# Patient Record
(demographics unavailable — no encounter records)

---

## 2024-10-18 NOTE — INTERPRETER SERVICES
[[employer] ] : provided by [employer]   [Interpreters_IDNumber] : [id number] [Interpreters_FullName] : Juma [TWNoteComboBox1] : Syrian

## 2024-10-18 NOTE — PLAN
[FreeTextEntry1] : Ms. Hui is a 78 year old female with past medical history of hypertension, type 2 diabetes, left knee replacement on doxycycline x 1 month for "inflammation" presented to ED on 10/15 with acute onset right-sided abdominal pain that radiates to the back that woke her from sleep. She was admitted and underwent laparoscopic appendectomy. She was discharged home on 10/16 to follow up with Dr. Ford. Seen today for follow up.   -Follow up with PCP Dr. Reyes, daughter to schedule -Follow up with surgeon Dr. Ford 10/29 -Follow up with Endo Dr. Lemus 11/11 -Follow up with GI Dr. Ahmadi 11/18  Educated patient and family on fall and safety precautions, aspiration precautions, good skin care and importance to notify MD / return to ED for any change in mental status or worsening s/s as reviewed. Reinforced provider follow up and medication compliance. 24/7 TCM contact provided and encouraged to call with any questions or concerns.

## 2024-10-18 NOTE — HISTORY OF PRESENT ILLNESS
[Home] : at home, [unfilled] , at the time of the visit. [Other Location: e.g. Home (Enter Location, City,State)___] : at [unfilled] [Verbal consent obtained from patient] : the patient, [unfilled] [Family Member] : family member [FreeTextEntry1] : F/u post hospital discharge [facility] [de-identified] : Ms. Hui is a 78 year old female with past medical history of hypertension, type 2 diabetes, left knee replacement on doxycycline x 1 month for "inflammation" presented to ED on 10/15 with acute onset right-sided abdominal pain that radiates to the back that woke her from sleep. CT scan in the ED showed: Fluid-filled, dilated appendix with minimal surrounding stranding, raising a question of appendicitis. Recommend clinical correlation. No bowel obstruction, organized fluid collection or intraperitoneal free air. Pancreatic ductal dilatation. Recommend comparison to previous outside study. Follow-up (MR/MRCP) may be obtained as indicated. Patient was taken to the OR and underwent a laparoscopic appendectomy. Patient tolerated the procedure well and was transferred from PACU to surgical floor. Post-operatively diet was advanced as tolerated. She was discharged home on 10/16 to follow up with Dr. Ford. Seen today for follow up.   Patient with no access to smart phone/laptop and does not have the ability to do the video telehealth, telephonic visit facilitated and completed with daughter Natalie. Since discharge home, she is feeling okay, pain is managed without medications and monitoring site. Medications and discharge instructions reviewed, aware to resume Metformin and Januvia - not monitoring fasting FS. Denies any fever/chills, SOB, CP, n/v/d or oozing/drainage. No polyuria, polydipsia or blurry vision.

## 2024-10-18 NOTE — REVIEW OF SYSTEMS
[Fever] : no fever [Chills] : no chills [Negative] : Musculoskeletal [de-identified] : s/p laparoscopic appendectomy incision site

## 2024-10-18 NOTE — PHYSICAL EXAM
[de-identified] : Physical exam limited d/t telephonic encounter, pt conversing iwth no distress noted

## 2024-11-11 NOTE — PHYSICAL EXAM

## 2024-11-11 NOTE — PHYSICAL EXAM

## 2024-11-22 NOTE — HISTORY OF PRESENT ILLNESS
[FreeTextEntry1] : Misty Hui was brought by her daughter and healthcare proxy for follow-up as post appendectomy hospitalization visit She was asked to see gastroenterologist follow-up for abnormal CT scan showing 4 mm size pancreatic duct in the head No history of pancreatico biliary disease in the past.  She is scheduled for MRCP MRI next week She denied fever, chills, nausea or vomiting, change in her bowel habits or any rectal bleeding However she reports mild abdominal pain at the surgical scar appetite and bowel functions are normal She got partial relief with simethicone tablets for her abdominal bloating

## 2024-11-22 NOTE — ASSESSMENT
[FreeTextEntry1] : Dilated pancreatic duct on recent CT scan likely nonspecific or age-related atrophy Recent appendectomy uneventful course of hospitalization Advised patient and her daughter to call me after MRI is done for further follow-up Dietary modifications discussed

## 2024-12-11 NOTE — REASON FOR VISIT
[Initial Consultation] : an initial consultation for [Family Member] : family member [[employer] ] : provided by [employer]   [FreeTextEntry2] : main PD dilatation [TWNoteComboBox1] : Djiboutian

## 2024-12-11 NOTE — HISTORY OF PRESENT ILLNESS
[de-identified] : Ms. GER SINGH is a 78-year-old Italian-speaking female presenting for initial consultation regarding mild PD dilation noted on inpatient CT scan. Referred by Dr. Cj Lopez. Her PMHx is significant for CAD, CHF NYHA class I with hypertrophic cardiomyopathy, aortic valve stenosis, anemia, GERD, DM Type II with stage 2 CKD and HTN, HLD; PSHx of s/p laparoscopic appendectomy 10/15/2024, s/p TAVR, hysterectomy. FMHx of "vaginal cancer" (mother) and aunt with unknown cancer. No personal history of pancreatitis.  Patient presented to the ED in October 2024 with abdominal pain, found to have appendicitis s/p laparoscopic appendectomy 10/15/2024 with Dr. Saunders. 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 No post-op complications. Postoperatively developed abdominal pain and jaundice.  Inpatient CT 10/15/2024 showed pancreatic ductal dilatation, 0.4 cm at the head. Other findings included small fat-containing umbilical hernia No prior imaging for comparison.   Further evaluation with MRCP 11/19/2024 demonstrated similar to the prior CT, the pancreatic duct within the uncinate of pancreas is dilated to 4 mm; no evidence for obstructing stone or mass; a follow-up with CT or MRI in approximately 6 months is recommended to ensure stability.  Patient also saw GI Dr. Ahmadi 11/11/24 to follow-up on MRCP results. Reports recent changes in blood sugars (takes Janumet and Lantus @ night) - saw endo 11/22/24.  She presented back to the ED 12/4/24 with jaundice.   A/P CT at that time demonstrated prominence of the pancreatic duct up to 5 mm but no obstruction. LFTs were WNL at that time.  Patient presents for initial consultation. She denies history of pancreatitis, changes in BMs, appetite changes, unintentional weight loss. She reports recent worsening DM. Scheduled to get repeat labs with Dr. Lopez.

## 2024-12-11 NOTE — REASON FOR VISIT
[Initial Consultation] : an initial consultation for [Family Member] : family member [[employer] ] : provided by [employer]   [FreeTextEntry2] : main PD dilatation [TWNoteComboBox1] : Trinidadian

## 2024-12-11 NOTE — ASSESSMENT
[FreeTextEntry1] : Ms. GER SINGH is a 78-year-old Moroccan-speaking female presenting for initial consultation regarding mild PD dilation noted on inpatient CT scan. Referred by Dr. Cj Lopez. Her PMHx is significant for CHF, aortic valve stenosis, DM Type II with stage 2 CKD and HTN. FMHx of "vaginal cancer" (mother) and aunt with unknown cancer.  Patient presented to the ED in October 2024, found to have appendicitis s/p laparoscopic appendectomy 10/15/2024 with Dr. Saunders. 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  Inpatient CT 10/15/2024 showed pancreatic ductal dilatation, 0.4 cm at the head. Other findings included small fat-containing umbilical hernia. Further evaluation with MRCP 11/19/2024 demonstrated similar to the prior CT, the pancreatic duct within the uncinate of pancreas is dilated to 4 mm; no evidence for obstructing stone or mass; a follow-up with CT or MRI in approximately 6 months is recommended to ensure stability. No prior imaging for comparison.   She presented back to the ED 12/4/24 with jaundice. A/P CT at that time demonstrated prominence of the pancreatic duct up to 5 mm but no obstruction. LFTs were WNL at that time.  I reviewed the results of her recent imaging studies which showed mild PD dilation up to 4 mm. I discussed that we classify dilation of 10 mm as worrisome. I discussed that pancreatic duct dilatation can be attributed to ductal ectasia, which is a completely benign process, or a small polyp in the duct. Given her reports of worsening DM, I recommend evaluation with an EUS - GI office will be in contact to schedule. If the EUS appears normal, will plan for MRI in 4-6 months and see her back after imaging. Patient was agreeable to plan.  She is OK to get repeat labs per Dr. Lopez.   Today, I personally spent 60 minutes in total time including reviewing imaging and studies, discussing complex treatment regimens, direct face to face time with the patient, patient education and counseling.

## 2024-12-11 NOTE — HISTORY OF PRESENT ILLNESS
[de-identified] : Ms. GER SINGH is a 78-year-old Maori-speaking female presenting for initial consultation regarding mild PD dilation noted on inpatient CT scan. Referred by Dr. Cj Lopez. Her PMHx is significant for CAD, CHF NYHA class I with hypertrophic cardiomyopathy, aortic valve stenosis, anemia, GERD, DM Type II with stage 2 CKD and HTN, HLD; PSHx of s/p laparoscopic appendectomy 10/15/2024, s/p TAVR, hysterectomy. FMHx of "vaginal cancer" (mother) and aunt with unknown cancer. No personal history of pancreatitis.  Patient presented to the ED in October 2024 with abdominal pain, found to have appendicitis s/p laparoscopic appendectomy 10/15/2024 with Dr. Saunders. 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 No post-op complications. Postoperatively developed abdominal pain and jaundice.  Inpatient CT 10/15/2024 showed pancreatic ductal dilatation, 0.4 cm at the head. Other findings included small fat-containing umbilical hernia No prior imaging for comparison.   Further evaluation with MRCP 11/19/2024 demonstrated similar to the prior CT, the pancreatic duct within the uncinate of pancreas is dilated to 4 mm; no evidence for obstructing stone or mass; a follow-up with CT or MRI in approximately 6 months is recommended to ensure stability.  Patient also saw GI Dr. Ahmadi 11/11/24 to follow-up on MRCP results. Reports recent changes in blood sugars (takes Janumet and Lantus @ night) - saw endo 11/22/24.  She presented back to the ED 12/4/24 with jaundice.   A/P CT at that time demonstrated prominence of the pancreatic duct up to 5 mm but no obstruction. LFTs were WNL at that time.  Patient presents for initial consultation. She denies history of pancreatitis, changes in BMs, appetite changes, unintentional weight loss. She reports recent worsening DM. Scheduled to get repeat labs with Dr. Lopez.

## 2024-12-11 NOTE — ASSESSMENT
[FreeTextEntry1] : Ms. GER SINGH is a 78-year-old Montserratian-speaking female presenting for initial consultation regarding mild PD dilation noted on inpatient CT scan. Referred by Dr. Cj Lopez. Her PMHx is significant for CHF, aortic valve stenosis, DM Type II with stage 2 CKD and HTN. FMHx of "vaginal cancer" (mother) and aunt with unknown cancer.  Patient presented to the ED in October 2024, found to have appendicitis s/p laparoscopic appendectomy 10/15/2024 with Dr. Saunders. 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  Inpatient CT 10/15/2024 showed pancreatic ductal dilatation, 0.4 cm at the head. Other findings included small fat-containing umbilical hernia. Further evaluation with MRCP 11/19/2024 demonstrated similar to the prior CT, the pancreatic duct within the uncinate of pancreas is dilated to 4 mm; no evidence for obstructing stone or mass; a follow-up with CT or MRI in approximately 6 months is recommended to ensure stability. No prior imaging for comparison.   She presented back to the ED 12/4/24 with jaundice. A/P CT at that time demonstrated prominence of the pancreatic duct up to 5 mm but no obstruction. LFTs were WNL at that time.  I reviewed the results of her recent imaging studies which showed mild PD dilation up to 4 mm. I discussed that we classify dilation of 10 mm as worrisome. I discussed that pancreatic duct dilatation can be attributed to ductal ectasia, which is a completely benign process, or a small polyp in the duct. Given her reports of worsening DM, I recommend evaluation with an EUS - GI office will be in contact to schedule. If the EUS appears normal, will plan for MRI in 4-6 months and see her back after imaging. Patient was agreeable to plan.  She is OK to get repeat labs per Dr. Lopez.   Today, I personally spent 60 minutes in total time including reviewing imaging and studies, discussing complex treatment regimens, direct face to face time with the patient, patient education and counseling.

## 2024-12-11 NOTE — HISTORY OF PRESENT ILLNESS
[de-identified] : Ms. GER SINGH is a 78-year-old Sami-speaking female presenting for initial consultation regarding mild PD dilation noted on inpatient CT scan. Referred by Dr. Cj Lopez. Her PMHx is significant for CAD, CHF NYHA class I with hypertrophic cardiomyopathy, aortic valve stenosis, anemia, GERD, DM Type II with stage 2 CKD and HTN, HLD; PSHx of s/p laparoscopic appendectomy 10/15/2024, s/p TAVR, hysterectomy. FMHx of "vaginal cancer" (mother) and aunt with unknown cancer. No personal history of pancreatitis.  Patient presented to the ED in October 2024 with abdominal pain, found to have appendicitis s/p laparoscopic appendectomy 10/15/2024 with Dr. Saunders. 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 No post-op complications. Postoperatively developed abdominal pain and jaundice.  Inpatient CT 10/15/2024 showed pancreatic ductal dilatation, 0.4 cm at the head. Other findings included small fat-containing umbilical hernia No prior imaging for comparison.   Further evaluation with MRCP 11/19/2024 demonstrated similar to the prior CT, the pancreatic duct within the uncinate of pancreas is dilated to 4 mm; no evidence for obstructing stone or mass; a follow-up with CT or MRI in approximately 6 months is recommended to ensure stability.  Patient also saw GI Dr. Ahmadi 11/11/24 to follow-up on MRCP results. Reports recent changes in blood sugars (takes Janumet and Lantus @ night) - saw endo 11/22/24.  She presented back to the ED 12/4/24 with jaundice.   A/P CT at that time demonstrated prominence of the pancreatic duct up to 5 mm but no obstruction. LFTs were WNL at that time.  Patient presents for initial consultation. She denies history of pancreatitis, changes in BMs, appetite changes, unintentional weight loss. She reports recent worsening DM. Scheduled to get repeat labs with Dr. Lopez.

## 2024-12-11 NOTE — END OF VISIT
[FreeTextEntry3] : By signing my name below, I, Meamyvianney Garza, attest that this documentation has been prepared under the direction and in the presence of Silvestre Kramer MD in the following sections HISTORY OF PRESENT ILLNESS; PAST MEDICAL/FAMILY/SOCIAL HISTORY; REVIEW OF SYSTEMS; PHYSICAL EXAM; ASSESSMENT/PLAN.

## 2024-12-11 NOTE — REASON FOR VISIT
[Initial Consultation] : an initial consultation for [Family Member] : family member [[employer] ] : provided by [employer]   [FreeTextEntry2] : main PD dilatation [TWNoteComboBox1] : Czech

## 2024-12-11 NOTE — ASSESSMENT
[FreeTextEntry1] : Ms. GRE SINGH is a 78-year-old Burundian-speaking female presenting for initial consultation regarding mild PD dilation noted on inpatient CT scan. Referred by Dr. Cj Lopez. Her PMHx is significant for CHF, aortic valve stenosis, DM Type II with stage 2 CKD and HTN. FMHx of "vaginal cancer" (mother) and aunt with unknown cancer.  Patient presented to the ED in October 2024, found to have appendicitis s/p laparoscopic appendectomy 10/15/2024 with Dr. Saunders. 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  Inpatient CT 10/15/2024 showed pancreatic ductal dilatation, 0.4 cm at the head. Other findings included small fat-containing umbilical hernia. Further evaluation with MRCP 11/19/2024 demonstrated similar to the prior CT, the pancreatic duct within the uncinate of pancreas is dilated to 4 mm; no evidence for obstructing stone or mass; a follow-up with CT or MRI in approximately 6 months is recommended to ensure stability. No prior imaging for comparison.   She presented back to the ED 12/4/24 with jaundice. A/P CT at that time demonstrated prominence of the pancreatic duct up to 5 mm but no obstruction. LFTs were WNL at that time.  I reviewed the results of her recent imaging studies which showed mild PD dilation up to 4 mm. I discussed that we classify dilation of 10 mm as worrisome. I discussed that pancreatic duct dilatation can be attributed to ductal ectasia, which is a completely benign process, or a small polyp in the duct. Given her reports of worsening DM, I recommend evaluation with an EUS - GI office will be in contact to schedule. If the EUS appears normal, will plan for MRI in 4-6 months and see her back after imaging. Patient was agreeable to plan.  She is OK to get repeat labs per Dr. Lopez.   Today, I personally spent 60 minutes in total time including reviewing imaging and studies, discussing complex treatment regimens, direct face to face time with the patient, patient education and counseling.

## 2025-01-07 NOTE — ASSESSMENT
[FreeTextEntry1] : 78-year-old  woman with a dilated pancreatic duct with no obstructing lesions or mass. I prescribed Gas-X and Prilosec for her abdominal symptoms Advised follow-up and ?? EUS examination after 6 months

## 2025-01-07 NOTE — HISTORY OF PRESENT ILLNESS
[FreeTextEntry1] : GER SINGH 78 year HF  came for follow up visit. She had abdominal imagings including CT scan and MRIs and EUS by Dr. Gilberto Kennedy for evaluation of dilated /4mm pancreatic duct in the head EUS examination was unremarkable without any mass, stones or obstructing lesions in the pancreas.  CBD was normal Biopsy showed non sp gastritis. Denies any NVCD or BPR.No chest pain ,cough or SOB.  Patient complaining of abdominal bloating and gas, has been taking famotidine without much relief Good appetite and no weight loss. Recent MRI, CT AP and EUS/bx reviewed and discussed with her

## 2025-05-12 NOTE — PHYSICAL EXAM

## 2025-05-12 NOTE — ASSESSMENT
[FreeTextEntry1] : 78-year-old  woman recently diagnosed to have anemia without any GI symptoms.  She is due for her interval colonoscopy.  Upper endoscopy was unremarkable.  I explained RBA and bowel prep for colonoscopy.  GaviLyte-N was ordered

## 2025-05-12 NOTE — HISTORY OF PRESENT ILLNESS
[FreeTextEntry1] : Misty Martinez came for follow-up, she was accompanied by her daughter.  She was assisted by a  on the phone.  She was told by her PCP  to have anemia on her recent CBC .  However she denied nausea, vomiting, rectal bleeding, melena or any change in bowel habits.  Her appetite is good.  She does not have any cardiac or pulmonary symptoms.  I called PCP office to send the results.She had upper endoscopy and EUS in January2025.  She had colonoscopy more than 5 to 6 years ago in [place] reportedly normal

## 2025-06-26 NOTE — REASON FOR VISIT
Nursing Note by Tonia Ny RMA at 07/03/17 05:37 PM     Author:  Tonia Ny RMA Service:  (none) Author Type:  Medical Assistant     Filed:  07/03/17 05:38 PM Encounter Date:  7/3/2017 Status:  Signed     :  Tonia Ny RMA (Medical Assistant)            Shave bx[LG1.1M] wound care provided to patient. He/she expressed understanding. All questions were answered. Name and phone number provided if needed.   Time out involving the provider, staff and patient in exam room performed prior to procedure.[LG1.1T]  Electronically Signed by:    KOFFI Gray , 7/3/2017[LG1.2T]        Revision History        User Key Date/Time User Provider Type Action    > LG1.2 07/03/17 05:38 PM Tonia Ny RMA Medical Assistant Sign     LG1.1 07/03/17 05:37 PM Tonia Ny RMA Medical Assistant     M - Manual, T - Template             [Follow-Up Visit] : a follow-up visit for

## 2025-07-01 NOTE — HISTORY OF PRESENT ILLNESS
[de-identified] : Ms. GER SINGH is a 78-year-old Frisian-speaking female presenting for 6 months follow up visit  with  ([id number]) regarding mild PD dilation noted on inpatient CT scan. Referred by Dr. Cj Lopez. Her PMHx is significant for CAD, CHF NYHA class I with hypertrophic cardiomyopathy, aortic valve stenosis, anemia, GERD, DM Type II with stage 2 CKD and HTN, HLD; PSHx of s/p laparoscopic appendectomy 10/15/2024, s/p TAVR, hysterectomy. FMHx of "vaginal cancer" (mother) and aunt with unknown cancer. No personal history of pancreatitis.  Patient presented to the ED in October 2024 with abdominal pain, found to have appendicitis s/p laparoscopic appendectomy 10/15/2024 with Dr. Saunders. 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 No post-op complications. Postoperatively developed abdominal pain and jaundice.  Inpatient CT 10/15/2024 showed pancreatic ductal dilatation, 0.4 cm at the head. Other findings included small fat-containing umbilical hernia No prior imaging for comparison.   Further evaluation with MRCP 11/19/2024 demonstrated similar to the prior CT, the pancreatic duct within the uncinate of pancreas is dilated to 4 mm; no evidence for obstructing stone or mass; a follow-up with CT or MRI in approximately 6 months is recommended to ensure stability.  Patient also saw GI Dr. Ahmadi 11/11/24 to follow-up on MRCP results. Reports recent changes in blood sugars (takes Janumet and Lantus @ night) - saw endo 11/22/24.  She presented back to the ED 12/4/24 with jaundice.   A/P CT at that time demonstrated prominence of the pancreatic duct up to 5 mm but no obstruction. LFTs were WNL at that time.  Patient presents for initial consultation. She denies history of pancreatitis, changes in BMs, appetite changes, unintentional weight loss. She reports recent worsening DM. We discussed that we classify dilation of 10 mm as worrisome. I discussed that pancreatic duct dilatation can be attributed to ductal ectasia, which is a completely benign process, or a small polyp in the duct. Given her reports of worsening DM, I recommend evaluation with an EUS, if negative EUS, will plan to repeat MRI in 6 months.   Interval history:  Underwent a EUS on 1/2/25 with Dr. Alfonso, noted a mildly prominent pancreatic duct at the head ( 5mm), no mass seen. Moderate distal erosive esophagitis and mild gastropathy, Path:  GI recommended PPI once a day and repeat EGD in 2-3 months to check esophagitis healing.   MRI 6/16/25 showed an unchanged dilated pancreatic duct within the uncinate process (4mm) w/o evidence of obstructing mass or stone. no evidence of choledocholithiasis.   Interval history 6/27/25; Patient presents for her 6 months follow up. Patient reports feeling well overall, and her DM have gotten better. Upon review of her imaging, I discussed that her new MRI and her pancreatic duct look stable. Discussed IPMN vs. ductal atresia, which she could have either. I recommend that we should continue following her at current evaluation.  We will plan to see patient back in 1 year with repeat MRI at that time.  7/1/25 visit: Patient present for follow-up.

## 2025-07-01 NOTE — ASSESSMENT
[FreeTextEntry1] : Ms. 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    Patient present for follow-up.  Today, I personally spent   minutes in total time including reviewing imaging and studies, discussing complex treatment regimens, direct face to face time with the patient, patient education and counseling.

## 2025-07-01 NOTE — ASSESSMENT
[FreeTextEntry1] : Ms. 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    Patient present for follow-up.  Today, I personally spent   minutes in total time including reviewing imaging and studies, discussing complex treatment regimens, direct face to face time with the patient, patient education and counseling.

## 2025-07-01 NOTE — HISTORY OF PRESENT ILLNESS
[de-identified] : Ms. GER SINGH is a 78-year-old Danish-speaking female presenting for 6 months follow up visit  with  ([id number]) regarding mild PD dilation noted on inpatient CT scan. Referred by Dr. Cj Lopez. Her PMHx is significant for CAD, CHF NYHA class I with hypertrophic cardiomyopathy, aortic valve stenosis, anemia, GERD, DM Type II with stage 2 CKD and HTN, HLD; PSHx of s/p laparoscopic appendectomy 10/15/2024, s/p TAVR, hysterectomy. FMHx of "vaginal cancer" (mother) and aunt with unknown cancer. No personal history of pancreatitis.  Patient presented to the ED in October 2024 with abdominal pain, found to have appendicitis s/p laparoscopic appendectomy 10/15/2024 with Dr. Saunders. 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 No post-op complications. Postoperatively developed abdominal pain and jaundice.  Inpatient CT 10/15/2024 showed pancreatic ductal dilatation, 0.4 cm at the head. Other findings included small fat-containing umbilical hernia No prior imaging for comparison.   Further evaluation with MRCP 11/19/2024 demonstrated similar to the prior CT, the pancreatic duct within the uncinate of pancreas is dilated to 4 mm; no evidence for obstructing stone or mass; a follow-up with CT or MRI in approximately 6 months is recommended to ensure stability.  Patient also saw GI Dr. Ahmadi 11/11/24 to follow-up on MRCP results. Reports recent changes in blood sugars (takes Janumet and Lantus @ night) - saw endo 11/22/24.  She presented back to the ED 12/4/24 with jaundice.   A/P CT at that time demonstrated prominence of the pancreatic duct up to 5 mm but no obstruction. LFTs were WNL at that time.  Patient presents for initial consultation. She denies history of pancreatitis, changes in BMs, appetite changes, unintentional weight loss. She reports recent worsening DM. We discussed that we classify dilation of 10 mm as worrisome. I discussed that pancreatic duct dilatation can be attributed to ductal ectasia, which is a completely benign process, or a small polyp in the duct. Given her reports of worsening DM, I recommend evaluation with an EUS, if negative EUS, will plan to repeat MRI in 6 months.   Interval history:  Underwent a EUS on 1/2/25 with Dr. Alfonso, noted a mildly prominent pancreatic duct at the head ( 5mm), no mass seen. Moderate distal erosive esophagitis and mild gastropathy, Path:  GI recommended PPI once a day and repeat EGD in 2-3 months to check esophagitis healing.   MRI 6/16/25 showed an unchanged dilated pancreatic duct within the uncinate process (4mm) w/o evidence of obstructing mass or stone. no evidence of choledocholithiasis.   Interval history 6/27/25; Patient presents for her 6 months follow up. Patient reports feeling well overall, and her DM have gotten better. Upon review of her imaging, I discussed that her new MRI and her pancreatic duct look stable. Discussed IPMN vs. ductal atresia, which she could have either. I recommend that we should continue following her at current evaluation.  We will plan to see patient back in 1 year with repeat MRI at that time.  7/1/25 visit: Patient present for follow-up.

## 2025-07-03 NOTE — ASSESSMENT
[FreeTextEntry1] : Ms. 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    Surveillance MRI 6/16/25 showed an unchanged dilated pancreatic duct within the uncinate process (4mm) w/o evidence of obstructing mass or stone. no evidence of choledocholithiasis.   Presents for her 6 months f/u visit. Upon review of her imaging, I discussed that her new MRI and her pancreatic duct look stable which is good news. We discussed the possible diagnosis of the cause of her PD dilatation, could be either be a IPMN vs. ductal atresia, which warrants annual surveillance. We will plan to see patient back in 1 year with repeat MRI at that time. Instructed patient to call our office which changes with her pancreas, such as worsen DM, new onset pancreatitis or jaundice. Patient and family verbalized understanding.   Today, I personally spent 15 minutes in total time including reviewing imaging and studies, discussing complex treatment regimens, direct face to face time with the patient, patient education and counseling.

## 2025-07-03 NOTE — ASSESSMENT
[FreeTextEntry1] : Ms. 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    Surveillance MRI 6/16/25 showed an unchanged dilated pancreatic duct within the uncinate process (4mm) w/o evidence of obstructing mass or stone. no evidence of choledocholithiasis.   Presents for her 6 months f/u visit. Upon review of her imaging, I discussed that her new MRI and her pancreatic duct look stable which is good news. We discussed the possible diagnosis of the cause of her PD dilatation, could be either be a IPMN vs. ductal atresia, which warrants annual surveillance. We will plan to see patient back in 1 year with repeat MRI at that time. Instructed patient to call our office which changes with her pancreas, such as worsen DM, new onset pancreatitis or jaundice. Patient and family verbalized understanding.   Today, I personally spent 15 minutes in total time including reviewing imaging and studies, discussing complex treatment regimens, direct face to face time with the patient, patient education and counseling.

## 2025-07-03 NOTE — HISTORY OF PRESENT ILLNESS
[de-identified] : Ms. GER SINGH is a 78-year-old Kinyarwanda-speaking female presenting for 6 months follow up visit  with  ([id number]) regarding mild PD dilation noted on inpatient CT scan. Referred by Dr. Cj Lopez. Her PMHx is significant for CAD, CHF NYHA class I with hypertrophic cardiomyopathy, aortic valve stenosis, anemia, GERD, DM Type II with stage 2 CKD and HTN, HLD; PSHx of s/p laparoscopic appendectomy 10/15/2024, s/p TAVR, hysterectomy. FMHx of "vaginal cancer" (mother) and aunt with unknown cancer. No personal history of pancreatitis.  Patient presented to the ED in October 2024 with abdominal pain, found to have appendicitis s/p laparoscopic appendectomy 10/15/2024 with Dr. Saunders. 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 No post-op complications. Postoperatively developed abdominal pain and jaundice.  Inpatient CT 10/15/2024 showed pancreatic ductal dilatation, 0.4 cm at the head. Other findings included small fat-containing umbilical hernia No prior imaging for comparison.   Further evaluation with MRCP 11/19/2024 demonstrated similar to the prior CT, the pancreatic duct within the uncinate of pancreas is dilated to 4 mm; no evidence for obstructing stone or mass; a follow-up with CT or MRI in approximately 6 months is recommended to ensure stability.  Patient also saw GI Dr. Ahmadi 11/11/24 to follow-up on MRCP results. Reports recent changes in blood sugars (takes Janumet and Lantus @ night) - saw endo 11/22/24.  She presented back to the ED 12/4/24 with jaundice.   A/P CT at that time demonstrated prominence of the pancreatic duct up to 5 mm but no obstruction. LFTs were WNL at that time.  Patient presents for initial consultation. She denies history of pancreatitis, changes in BMs, appetite changes, unintentional weight loss. She reports recent worsening DM. We discussed that we classify dilation of 10 mm as worrisome. I discussed that pancreatic duct dilatation can be attributed to ductal ectasia, which is a completely benign process, or a small polyp in the duct. Given her reports of worsening DM, I recommend evaluation with an EUS, if negative EUS, will plan to repeat MRI in 6 months.   Interval history:  Underwent a EUS on 1/2/25 with Dr. Alfonso, noted a mildly prominent pancreatic duct at the head ( 5mm), no mass seen. Moderate distal erosive esophagitis and mild gastropathy, Path:  GI recommended PPI once a day and repeat EGD in 2-3 months to check esophagitis healing.   MRI 6/16/25 showed an unchanged dilated pancreatic duct within the uncinate process (4mm) w/o evidence of obstructing mass or stone. no evidence of choledocholithiasis.   Patient presents for her 6 months follow up. Patient reports feeling well overall, and her DM have gotten better.

## 2025-07-03 NOTE — ASSESSMENT
[FreeTextEntry1] : Ms. 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    Surveillance MRI 6/16/25 showed an unchanged dilated pancreatic duct within the uncinate process (4mm) w/o evidence of obstructing mass or stone. no evidence of choledocholithiasis.   Presents for her 6 months f/u visit. Upon review of her imaging, I discussed that her new MRI and her pancreatic duct look stable which is good news. We discussed the possible diagnosis of the cause of her PD dilatation, could be either be a IPMN vs. ductal atresia, which warrants annual surveillance. We will plan to see patient back in 1 year with repeat MRI at that time. Instructed patient to call our office which changes with her pancreas, such as worsen DM, new onset pancreatitis or jaundice. Patient and family verbalized understanding.   Today, I personally spent 15 minutes in total time including reviewing imaging and studies, discussing complex treatment regimens, direct face to face time with the patient, patient education and counseling.

## 2025-07-03 NOTE — HISTORY OF PRESENT ILLNESS
[de-identified] : Ms. GER SINGH is a 78-year-old Kazakh-speaking female presenting for 6 months follow up visit  with  ([id number]) regarding mild PD dilation noted on inpatient CT scan. Referred by Dr. Cj Lopez. Her PMHx is significant for CAD, CHF NYHA class I with hypertrophic cardiomyopathy, aortic valve stenosis, anemia, GERD, DM Type II with stage 2 CKD and HTN, HLD; PSHx of s/p laparoscopic appendectomy 10/15/2024, s/p TAVR, hysterectomy. FMHx of "vaginal cancer" (mother) and aunt with unknown cancer. No personal history of pancreatitis.  Patient presented to the ED in October 2024 with abdominal pain, found to have appendicitis s/p laparoscopic appendectomy 10/15/2024 with Dr. Saunders. 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 No post-op complications. Postoperatively developed abdominal pain and jaundice.  Inpatient CT 10/15/2024 showed pancreatic ductal dilatation, 0.4 cm at the head. Other findings included small fat-containing umbilical hernia No prior imaging for comparison.   Further evaluation with MRCP 11/19/2024 demonstrated similar to the prior CT, the pancreatic duct within the uncinate of pancreas is dilated to 4 mm; no evidence for obstructing stone or mass; a follow-up with CT or MRI in approximately 6 months is recommended to ensure stability.  Patient also saw GI Dr. Ahmadi 11/11/24 to follow-up on MRCP results. Reports recent changes in blood sugars (takes Janumet and Lantus @ night) - saw endo 11/22/24.  She presented back to the ED 12/4/24 with jaundice.   A/P CT at that time demonstrated prominence of the pancreatic duct up to 5 mm but no obstruction. LFTs were WNL at that time.  Patient presents for initial consultation. She denies history of pancreatitis, changes in BMs, appetite changes, unintentional weight loss. She reports recent worsening DM. We discussed that we classify dilation of 10 mm as worrisome. I discussed that pancreatic duct dilatation can be attributed to ductal ectasia, which is a completely benign process, or a small polyp in the duct. Given her reports of worsening DM, I recommend evaluation with an EUS, if negative EUS, will plan to repeat MRI in 6 months.   Interval history:  Underwent a EUS on 1/2/25 with Dr. Alfonso, noted a mildly prominent pancreatic duct at the head ( 5mm), no mass seen. Moderate distal erosive esophagitis and mild gastropathy, Path:  GI recommended PPI once a day and repeat EGD in 2-3 months to check esophagitis healing.   MRI 6/16/25 showed an unchanged dilated pancreatic duct within the uncinate process (4mm) w/o evidence of obstructing mass or stone. no evidence of choledocholithiasis.   Patient presents for her 6 months follow up. Patient reports feeling well overall, and her DM have gotten better.

## 2025-07-03 NOTE — HISTORY OF PRESENT ILLNESS
[de-identified] : Ms. GER SINGH is a 78-year-old Kazakh-speaking female presenting for 6 months follow up visit  with  ([id number]) regarding mild PD dilation noted on inpatient CT scan. Referred by Dr. Cj Lopez. Her PMHx is significant for CAD, CHF NYHA class I with hypertrophic cardiomyopathy, aortic valve stenosis, anemia, GERD, DM Type II with stage 2 CKD and HTN, HLD; PSHx of s/p laparoscopic appendectomy 10/15/2024, s/p TAVR, hysterectomy. FMHx of "vaginal cancer" (mother) and aunt with unknown cancer. No personal history of pancreatitis.  Patient presented to the ED in October 2024 with abdominal pain, found to have appendicitis s/p laparoscopic appendectomy 10/15/2024 with Dr. Saunders. 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 No post-op complications. Postoperatively developed abdominal pain and jaundice.  Inpatient CT 10/15/2024 showed pancreatic ductal dilatation, 0.4 cm at the head. Other findings included small fat-containing umbilical hernia No prior imaging for comparison.   Further evaluation with MRCP 11/19/2024 demonstrated similar to the prior CT, the pancreatic duct within the uncinate of pancreas is dilated to 4 mm; no evidence for obstructing stone or mass; a follow-up with CT or MRI in approximately 6 months is recommended to ensure stability.  Patient also saw GI Dr. Ahmadi 11/11/24 to follow-up on MRCP results. Reports recent changes in blood sugars (takes Janumet and Lantus @ night) - saw endo 11/22/24.  She presented back to the ED 12/4/24 with jaundice.   A/P CT at that time demonstrated prominence of the pancreatic duct up to 5 mm but no obstruction. LFTs were WNL at that time.  Patient presents for initial consultation. She denies history of pancreatitis, changes in BMs, appetite changes, unintentional weight loss. She reports recent worsening DM. We discussed that we classify dilation of 10 mm as worrisome. I discussed that pancreatic duct dilatation can be attributed to ductal ectasia, which is a completely benign process, or a small polyp in the duct. Given her reports of worsening DM, I recommend evaluation with an EUS, if negative EUS, will plan to repeat MRI in 6 months.   Interval history:  Underwent a EUS on 1/2/25 with Dr. Alfonso, noted a mildly prominent pancreatic duct at the head ( 5mm), no mass seen. Moderate distal erosive esophagitis and mild gastropathy, Path:  GI recommended PPI once a day and repeat EGD in 2-3 months to check esophagitis healing.   MRI 6/16/25 showed an unchanged dilated pancreatic duct within the uncinate process (4mm) w/o evidence of obstructing mass or stone. no evidence of choledocholithiasis.   Patient presents for her 6 months follow up. Patient reports feeling well overall, and her DM have gotten better.

## 2025-07-03 NOTE — HISTORY OF PRESENT ILLNESS
[de-identified] : Ms. GER SINGH is a 78-year-old Indonesian-speaking female presenting for 6 months follow up visit  with  ([id number]) regarding mild PD dilation noted on inpatient CT scan. Referred by Dr. Cj Lopez. Her PMHx is significant for CAD, CHF NYHA class I with hypertrophic cardiomyopathy, aortic valve stenosis, anemia, GERD, DM Type II with stage 2 CKD and HTN, HLD; PSHx of s/p laparoscopic appendectomy 10/15/2024, s/p TAVR, hysterectomy. FMHx of "vaginal cancer" (mother) and aunt with unknown cancer. No personal history of pancreatitis.  Patient presented to the ED in October 2024 with abdominal pain, found to have appendicitis s/p laparoscopic appendectomy 10/15/2024 with Dr. Saunders. 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 No post-op complications. Postoperatively developed abdominal pain and jaundice.  Inpatient CT 10/15/2024 showed pancreatic ductal dilatation, 0.4 cm at the head. Other findings included small fat-containing umbilical hernia No prior imaging for comparison.   Further evaluation with MRCP 11/19/2024 demonstrated similar to the prior CT, the pancreatic duct within the uncinate of pancreas is dilated to 4 mm; no evidence for obstructing stone or mass; a follow-up with CT or MRI in approximately 6 months is recommended to ensure stability.  Patient also saw GI Dr. Ahmadi 11/11/24 to follow-up on MRCP results. Reports recent changes in blood sugars (takes Janumet and Lantus @ night) - saw endo 11/22/24.  She presented back to the ED 12/4/24 with jaundice.   A/P CT at that time demonstrated prominence of the pancreatic duct up to 5 mm but no obstruction. LFTs were WNL at that time.  Patient presents for initial consultation. She denies history of pancreatitis, changes in BMs, appetite changes, unintentional weight loss. She reports recent worsening DM. We discussed that we classify dilation of 10 mm as worrisome. I discussed that pancreatic duct dilatation can be attributed to ductal ectasia, which is a completely benign process, or a small polyp in the duct. Given her reports of worsening DM, I recommend evaluation with an EUS, if negative EUS, will plan to repeat MRI in 6 months.   Interval history:  Underwent a EUS on 1/2/25 with Dr. Alfonso, noted a mildly prominent pancreatic duct at the head ( 5mm), no mass seen. Moderate distal erosive esophagitis and mild gastropathy, Path:  GI recommended PPI once a day and repeat EGD in 2-3 months to check esophagitis healing.   MRI 6/16/25 showed an unchanged dilated pancreatic duct within the uncinate process (4mm) w/o evidence of obstructing mass or stone. no evidence of choledocholithiasis.   Patient presents for her 6 months follow up. Patient reports feeling well overall, and her DM have gotten better.

## 2025-07-03 NOTE — ASSESSMENT
[FreeTextEntry1] : Ms. 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    Surveillance MRI 6/16/25 showed an unchanged dilated pancreatic duct within the uncinate process (4mm) w/o evidence of obstructing mass or stone. no evidence of choledocholithiasis.   Presents for her 6 months f/u visit. Upon review of her imaging, I discussed that her new MRI and her pancreatic duct look stable which is good news. We discussed the possible diagnosis of the cause of her PD dilatation, could be either be a IPMN vs. ductal atresia, which warrants annual surveillance. We will plan to see patient back in 1 year with repeat MRI at that time. Instructed patient to call our office which changes with her pancreas, such as worsen DM, new onset pancreatitis or jaundice. Patient and family verbalized understanding.   Today, I personally spent 15 minutes in total time including reviewing imaging and studies, discussing complex treatment regimens, direct face to face time with the patient, patient education and counseling.

## 2025-07-03 NOTE — HISTORY OF PRESENT ILLNESS
[de-identified] : Ms. GER SINGH is a 78-year-old Sinhala-speaking female presenting for 6 months follow up visit  with  ([id number]) regarding mild PD dilation noted on inpatient CT scan. Referred by Dr. Cj Lopez. Her PMHx is significant for CAD, CHF NYHA class I with hypertrophic cardiomyopathy, aortic valve stenosis, anemia, GERD, DM Type II with stage 2 CKD and HTN, HLD; PSHx of s/p laparoscopic appendectomy 10/15/2024, s/p TAVR, hysterectomy. FMHx of "vaginal cancer" (mother) and aunt with unknown cancer. No personal history of pancreatitis.  Patient presented to the ED in October 2024 with abdominal pain, found to have appendicitis s/p laparoscopic appendectomy 10/15/2024 with Dr. Saunders. 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 No post-op complications. Postoperatively developed abdominal pain and jaundice.  Inpatient CT 10/15/2024 showed pancreatic ductal dilatation, 0.4 cm at the head. Other findings included small fat-containing umbilical hernia No prior imaging for comparison.   Further evaluation with MRCP 11/19/2024 demonstrated similar to the prior CT, the pancreatic duct within the uncinate of pancreas is dilated to 4 mm; no evidence for obstructing stone or mass; a follow-up with CT or MRI in approximately 6 months is recommended to ensure stability.  Patient also saw GI Dr. Ahmadi 11/11/24 to follow-up on MRCP results. Reports recent changes in blood sugars (takes Janumet and Lantus @ night) - saw endo 11/22/24.  She presented back to the ED 12/4/24 with jaundice.   A/P CT at that time demonstrated prominence of the pancreatic duct up to 5 mm but no obstruction. LFTs were WNL at that time.  Patient presents for initial consultation. She denies history of pancreatitis, changes in BMs, appetite changes, unintentional weight loss. She reports recent worsening DM. We discussed that we classify dilation of 10 mm as worrisome. I discussed that pancreatic duct dilatation can be attributed to ductal ectasia, which is a completely benign process, or a small polyp in the duct. Given her reports of worsening DM, I recommend evaluation with an EUS, if negative EUS, will plan to repeat MRI in 6 months.   Interval history:  Underwent a EUS on 1/2/25 with Dr. Alfonso, noted a mildly prominent pancreatic duct at the head ( 5mm), no mass seen. Moderate distal erosive esophagitis and mild gastropathy, Path:  GI recommended PPI once a day and repeat EGD in 2-3 months to check esophagitis healing.   MRI 6/16/25 showed an unchanged dilated pancreatic duct within the uncinate process (4mm) w/o evidence of obstructing mass or stone. no evidence of choledocholithiasis.   Patient presents for her 6 months follow up. Patient reports feeling well overall, and her DM have gotten better.

## 2025-07-03 NOTE — ASSESSMENT
[FreeTextEntry1] : Ms. 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    Surveillance MRI 6/16/25 showed an unchanged dilated pancreatic duct within the uncinate process (4mm) w/o evidence of obstructing mass or stone. no evidence of choledocholithiasis.   Presents for her 6 months f/u visit. Upon review of her imaging, I discussed that her new MRI and her pancreatic duct look stable which is good news. We discussed the possible diagnosis of the cause of her PD dilatation, could be either be a IPMN vs. ductal atresia, which warrants annual surveillance. We will plan to see patient back in 1 year with repeat MRI at that time. Instructed patient to call our office which changes with her pancreas, such as worsen DM, new onset pancreatitis or jaundice. Patient and family verbalized understanding.   Today, I personally spent 15 minutes in total time including reviewing imaging and studies, discussing complex treatment regimens, direct face to face time with the patient, patient education and counseling.